# Patient Record
Sex: FEMALE | Race: WHITE
[De-identification: names, ages, dates, MRNs, and addresses within clinical notes are randomized per-mention and may not be internally consistent; named-entity substitution may affect disease eponyms.]

---

## 2018-07-08 ENCOUNTER — HOSPITAL ENCOUNTER (EMERGENCY)
Dept: HOSPITAL 94 - ER | Age: 28
Discharge: HOME | End: 2018-07-08
Payer: COMMERCIAL

## 2018-07-08 VITALS — WEIGHT: 293 LBS | HEIGHT: 67 IN | BODY MASS INDEX: 45.99 KG/M2

## 2018-07-08 VITALS — SYSTOLIC BLOOD PRESSURE: 141 MMHG | DIASTOLIC BLOOD PRESSURE: 50 MMHG

## 2018-07-08 DIAGNOSIS — Z79.899: ICD-10-CM

## 2018-07-08 DIAGNOSIS — Z88.2: ICD-10-CM

## 2018-07-08 DIAGNOSIS — F41.9: Primary | ICD-10-CM

## 2018-07-08 DIAGNOSIS — J45.909: ICD-10-CM

## 2018-07-08 PROCEDURE — 99284 EMERGENCY DEPT VISIT MOD MDM: CPT

## 2021-01-10 ENCOUNTER — HOSPITAL ENCOUNTER (EMERGENCY)
Dept: HOSPITAL 94 - ER | Age: 31
Discharge: HOME | End: 2021-01-10
Payer: COMMERCIAL

## 2021-01-10 VITALS — WEIGHT: 293 LBS | HEIGHT: 67 IN | BODY MASS INDEX: 45.99 KG/M2

## 2021-01-10 VITALS — DIASTOLIC BLOOD PRESSURE: 114 MMHG | SYSTOLIC BLOOD PRESSURE: 165 MMHG

## 2021-01-10 DIAGNOSIS — Z88.2: ICD-10-CM

## 2021-01-10 DIAGNOSIS — R07.89: Primary | ICD-10-CM

## 2021-01-10 DIAGNOSIS — Z79.899: ICD-10-CM

## 2021-01-10 DIAGNOSIS — J45.909: ICD-10-CM

## 2021-01-10 PROCEDURE — 93005 ELECTROCARDIOGRAM TRACING: CPT

## 2021-01-10 PROCEDURE — 99283 EMERGENCY DEPT VISIT LOW MDM: CPT

## 2021-01-10 PROCEDURE — 71045 X-RAY EXAM CHEST 1 VIEW: CPT

## 2021-01-10 NOTE — NUR
pt complate of chest pain first time had it was around mana but is pretty 
sure she coused it   this  chest pain started last night  and it comes and goes 
with some movement